# Patient Record
Sex: FEMALE | Race: WHITE
[De-identification: names, ages, dates, MRNs, and addresses within clinical notes are randomized per-mention and may not be internally consistent; named-entity substitution may affect disease eponyms.]

---

## 2017-12-31 ENCOUNTER — HEALTH MAINTENANCE LETTER (OUTPATIENT)
Age: 57
End: 2017-12-31

## 2021-07-17 ENCOUNTER — TELEPHONE (OUTPATIENT)
Dept: FAMILY MEDICINE | Facility: CLINIC | Age: 61
End: 2021-07-17

## 2021-07-17 DIAGNOSIS — G44.219 EPISODIC TENSION-TYPE HEADACHE, NOT INTRACTABLE: Primary | ICD-10-CM

## 2021-07-17 RX ORDER — METHYLPREDNISOLONE 4 MG
TABLET, DOSE PACK ORAL
Qty: 21 TABLET | Refills: 0 | Status: SHIPPED | OUTPATIENT
Start: 2021-07-17 | End: 2022-02-19

## 2021-07-17 NOTE — TELEPHONE ENCOUNTER
"Luisa has a headache that she has had for almost 24 hours.  She was shopping and felt it \"coming on,\" was unable to treat it quickly, was exposed to some allergan/triggers in the form of a heavy perfume in a store.  The headache is on the right side of her head, a pulsatile characteristic comes and goes, it radiates from her right posterior cervical neck are, around the right side of her head and to the top of her head.  She has treated with multiple doses of ibuprofen and acetaminophen with minimal relief.  She has used an ice pack and rest, that is somewhat helpful.  She is slightly nauseated, no blurring of vision.  The headache makes her tired, but she is not sure if it the strain of travel as well.    Luisa is in town to visit family, she primarily lives in [facility].  In [place] she is being treated for headaches, they are caused by inflammation of the facet joints in the neck, and at times are triggered by migraine-like triggers.  She uses physical therapy techniques and over the counter medications which are usually successful.  She has had to resort to steroids when the inflammation is not resolved by the above measures.    It was recommended to her to carry a medrol dosepak with her for travel, she forgot before leaving [place] and feels that is what will be most helpful now.    Luisa returns to [place] regularly, due to COVID that travel did not occur for 2 years.  She would like to have established care at the [facility] and will come into the clinic on her next visit if necessary.    A medrol dosepak was prescribed, she will let me know if she does not have relief as she usually does from this treatment.  We would consider either in-person or ED.  "